# Patient Record
Sex: MALE | Employment: OTHER
[De-identification: names, ages, dates, MRNs, and addresses within clinical notes are randomized per-mention and may not be internally consistent; named-entity substitution may affect disease eponyms.]

---

## 2018-12-28 ENCOUNTER — RECORDS - HEALTHEAST (OUTPATIENT)
Dept: ADMINISTRATIVE | Facility: OTHER | Age: 48
End: 2018-12-28

## 2019-01-09 ENCOUNTER — HOSPITAL ENCOUNTER (OUTPATIENT)
Dept: CARDIOLOGY | Facility: CLINIC | Age: 49
Discharge: HOME | End: 2019-01-09
Attending: FAMILY MEDICINE

## 2019-01-09 DIAGNOSIS — R03.0 ELEVATED BLOOD PRESSURE READING: ICD-10-CM

## 2019-01-09 DIAGNOSIS — R79.82 ELEVATED C-REACTIVE PROTEIN (CRP): ICD-10-CM

## 2019-01-09 DIAGNOSIS — R00.2 PALPITATION: ICD-10-CM

## 2019-01-09 LAB
AORTIC ROOT: 3.6 CM
AORTIC VALVE MEAN VELOCITY: 71.2 CM/S
ASCENDING AORTA: 3.2 CM
AV DIMENSIONLESS INDEX VTI: 0.7
AV MEAN GRADIENT: 2 MMHG
AV PEAK GRADIENT: 3.9 MMHG
AV VALVE AREA: 3.1 CM2
AV VELOCITY RATIO: 0.8
BSA FOR ECHO PROCEDURE: 2.01 M2
CV BLOOD PRESSURE: ABNORMAL MMHG
CV ECHO HEIGHT: 65 IN
CV ECHO WEIGHT: 194 LBS
DOP CALC AO PEAK VEL: 99.3 CM/S
DOP CALC AO VTI: 18.3 CM
DOP CALC LVOT AREA: 4.15 CM2
DOP CALC LVOT DIAMETER: 2.3 CM
DOP CALC LVOT PEAK VEL: 76.8 CM/S
DOP CALC LVOT STROKE VOLUME: 56.9 CM3
DOP CALCLVOT PEAK VEL VTI: 13.7 CM
EJECTION FRACTION: 58 % (ref 55–75)
FRACTIONAL SHORTENING: 31 % (ref 28–44)
INTERVENTRICULAR SEPTUM IN END DIASTOLE: 1.2 CM (ref 0.6–1)
IVS/PW RATIO: 1.1
LA AREA 1: 14.6 CM2
LA AREA 2: 18.7 CM2
LEFT ATRIUM LENGTH: 4.92 CM
LEFT ATRIUM SIZE: 4 CM
LEFT ATRIUM VOLUME INDEX: 23.5 ML/M2
LEFT ATRIUM VOLUME: 47.2 ML
LEFT VENTRICLE CARDIAC INDEX: 2.1 L/MIN/M2
LEFT VENTRICLE CARDIAC OUTPUT: 4.2 L/MIN
LEFT VENTRICLE DIASTOLIC VOLUME INDEX: 29.4 CM3/M2 (ref 34–74)
LEFT VENTRICLE DIASTOLIC VOLUME: 59 CM3 (ref 62–150)
LEFT VENTRICLE HEART RATE: 74 BPM
LEFT VENTRICLE MASS INDEX: 83.3 G/M2
LEFT VENTRICLE SYSTOLIC VOLUME INDEX: 12.4 CM3/M2 (ref 11–31)
LEFT VENTRICLE SYSTOLIC VOLUME: 25 CM3 (ref 21–61)
LEFT VENTRICULAR INTERNAL DIMENSION IN DIASTOLE: 4.2 CM (ref 4.2–5.8)
LEFT VENTRICULAR INTERNAL DIMENSION IN SYSTOLE: 2.9 CM (ref 2.5–4)
LEFT VENTRICULAR MASS: 167.4 G
LEFT VENTRICULAR OUTFLOW TRACT MEAN GRADIENT: 1 MMHG
LEFT VENTRICULAR OUTFLOW TRACT MEAN VELOCITY: 52.5 CM/S
LEFT VENTRICULAR OUTFLOW TRACT PEAK GRADIENT: 2 MMHG
LEFT VENTRICULAR POSTERIOR WALL IN END DIASTOLE: 1.1 CM (ref 0.6–1)
LV STROKE VOLUME INDEX: 28.3 ML/M2
MITRAL VALVE E/A RATIO: 0.8
MV AVERAGE E/E' RATIO: 5.3 CM/S
MV DECELERATION TIME: 254 MS
MV E'TISSUE VEL-LAT: 9.07 CM/S
MV E'TISSUE VEL-MED: 5.26 CM/S
MV LATERAL E/E' RATIO: 4.2
MV MEDIAL E/E' RATIO: 7.3
MV PEAK A VELOCITY: 47.2 CM/S
MV PEAK E VELOCITY: 38.3 CM/S
NUC REST DIASTOLIC VOLUME INDEX: 3104 LBS
NUC REST SYSTOLIC VOLUME INDEX: 65 IN
TRICUSPID VALVE ANULAR PLANE SYSTOLIC EXCURSION: 1.3 CM

## 2019-01-09 ASSESSMENT — MIFFLIN-ST. JEOR: SCORE: 1676.86

## 2019-01-10 ENCOUNTER — RECORDS - HEALTHEAST (OUTPATIENT)
Dept: ADMINISTRATIVE | Facility: OTHER | Age: 49
End: 2019-01-10

## 2019-01-10 ENCOUNTER — HOSPITAL ENCOUNTER (OUTPATIENT)
Dept: CARDIOLOGY | Facility: HOSPITAL | Age: 49
Discharge: HOME | End: 2019-01-10
Attending: FAMILY MEDICINE

## 2019-01-10 DIAGNOSIS — R07.89 CHEST TIGHTNESS: ICD-10-CM

## 2019-01-10 LAB
CV STRESS CURRENT BP HE: NORMAL
CV STRESS CURRENT HR HE: 107
CV STRESS CURRENT HR HE: 110
CV STRESS CURRENT HR HE: 111
CV STRESS CURRENT HR HE: 111
CV STRESS CURRENT HR HE: 112
CV STRESS CURRENT HR HE: 113
CV STRESS CURRENT HR HE: 114
CV STRESS CURRENT HR HE: 115
CV STRESS CURRENT HR HE: 115
CV STRESS CURRENT HR HE: 116
CV STRESS CURRENT HR HE: 117
CV STRESS CURRENT HR HE: 119
CV STRESS CURRENT HR HE: 123
CV STRESS CURRENT HR HE: 124
CV STRESS CURRENT HR HE: 125
CV STRESS CURRENT HR HE: 126
CV STRESS CURRENT HR HE: 131
CV STRESS CURRENT HR HE: 132
CV STRESS CURRENT HR HE: 135
CV STRESS CURRENT HR HE: 136
CV STRESS CURRENT HR HE: 136
CV STRESS CURRENT HR HE: 141
CV STRESS CURRENT HR HE: 148
CV STRESS CURRENT HR HE: 149
CV STRESS CURRENT HR HE: 153
CV STRESS CURRENT HR HE: 153
CV STRESS CURRENT HR HE: 154
CV STRESS CURRENT HR HE: 154
CV STRESS CURRENT HR HE: 93
CV STRESS DEVIATION TIME HE: NORMAL
CV STRESS ECHO PERCENT HR HE: NORMAL
CV STRESS EXERCISE STAGE HE: NORMAL
CV STRESS FINAL RESTING BP HE: NORMAL
CV STRESS FINAL RESTING HR HE: 110
CV STRESS MAX HR HE: 154
CV STRESS MAX TREADMILL GRADE HE: 16
CV STRESS MAX TREADMILL SPEED HE: 4.2
CV STRESS PEAK DIA BP HE: NORMAL
CV STRESS PEAK SYS BP HE: NORMAL
CV STRESS PHASE HE: NORMAL
CV STRESS PROTOCOL HE: NORMAL
CV STRESS RESTING PT POSITION HE: NORMAL
CV STRESS RESTING PT POSITION HE: NORMAL
CV STRESS ST DEVIATION AMOUNT HE: NORMAL
CV STRESS ST DEVIATION ELEVATION HE: NORMAL
CV STRESS ST EVELATION AMOUNT HE: NORMAL
CV STRESS TEST TYPE HE: NORMAL
CV STRESS TOTAL STAGE TIME MIN 1 HE: NORMAL
ECHO EJECTION FRACTION ESTIMATED: 60 %
STRESS ECHO BASELINE BP: NORMAL
STRESS ECHO BASELINE HR: 96
STRESS ECHO CALCULATED PERCENT HR: 89 %
STRESS ECHO LAST STRESS BP: NORMAL
STRESS ECHO LAST STRESS HR: 154
STRESS ECHO POST ESTIMATED WORKLOAD: 12.1
STRESS ECHO POST EXERCISE DUR MIN: 11
STRESS ECHO POST EXERCISE DUR SEC: 59
STRESS ECHO TARGET HR: 148

## 2020-01-28 ENCOUNTER — RECORDS - HEALTHEAST (OUTPATIENT)
Dept: ADMINISTRATIVE | Facility: OTHER | Age: 50
End: 2020-01-28

## 2020-02-28 ENCOUNTER — OFFICE VISIT (OUTPATIENT)
Dept: ENDOCRINOLOGY | Facility: CLINIC | Age: 50
End: 2020-02-28
Payer: COMMERCIAL

## 2020-02-28 VITALS
OXYGEN SATURATION: 95 % | SYSTOLIC BLOOD PRESSURE: 124 MMHG | HEART RATE: 78 BPM | WEIGHT: 194.6 LBS | DIASTOLIC BLOOD PRESSURE: 80 MMHG

## 2020-02-28 DIAGNOSIS — R79.89 LOW TESTOSTERONE IN MALE: Primary | ICD-10-CM

## 2020-02-28 PROCEDURE — 99204 OFFICE O/P NEW MOD 45 MIN: CPT | Performed by: INTERNAL MEDICINE

## 2020-02-28 RX ORDER — MOMETASONE FUROATE AND FORMOTEROL FUMARATE DIHYDRATE 100; 5 UG/1; UG/1
AEROSOL RESPIRATORY (INHALATION)
COMMUNITY
Start: 2020-02-17

## 2020-02-28 RX ORDER — PROPRANOLOL HCL 60 MG
CAPSULE, EXTENDED RELEASE 24HR ORAL
COMMUNITY
Start: 2020-01-24

## 2020-02-28 RX ORDER — MULTIVIT-MIN/IRON FUM/FOLIC AC 7.5 MG-4
1 TABLET ORAL
COMMUNITY

## 2020-02-28 RX ORDER — PRASTERONE (DHEA) 50 MG
CAPSULE ORAL
COMMUNITY

## 2020-02-28 NOTE — PROGRESS NOTES
"CC: Low testosterone.     HPI: Patient here for evaluation of low testosterone.   States 5 years ago, in [place], he was tested as part of \"normal labs\" and told he had \"borderline levels\".   Began DHEA 3 years ago. Takes 50 mcg daily.     Of note, began keto diet 1 month ago.   He has lost 12 pounds so far.   His energy has improved as well.     Notes some trouble getting an erection. Feels this is 2/2 propanolol use.   This has improved since starting keto diet.   Libido has declined over the years.     He has never fathered a child.   Puberty on par with peers.     Only one testicle. 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 It was never removed.     No personal history of blood clots.     No snoring.     He has been taking sea kelp for the last 9 months for \"thyroid inflammation\". States the inflammation has \"cleared up\".     Labs from 1/24/2020:  DHEA-S 407 ()  Testosterone total 223 (250-1100) drawn at 0900.     ROS: 10 point ROS neg other than the symptoms noted above in the HPI.    PMH:   Asthma   HTN    Meds:  Current Outpatient Medications   Medication     Cholecalciferol (VITAMIN D3) 25 MCG (1000 UT) CAPS     DHEA 25 MG CAPS     DULERA 100-5 MCG/ACT inhaler     melatonin 3 MG CAPS     multivitamins w/minerals tablet     propranolol ER (INDERAL LA) 60 MG 24 hr capsule     Ubiquinol 100 MG CAPS     No current facility-administered medications for this visit.      FHX:   Mother has hypothyroidism  Sister has MS.   \"Clotting issues\" on his mother's side of the family.     SHX:  Non-smoker.   Works in [place] and travels back and forth to [place].     Exam:   Vital signs:      BP: 124/80 Pulse: 78     SpO2: 95 %       Weight: 88.3 kg (194 lb 9.6 oz)  There is no height or weight on file to calculate BMI.  Gen: In NAD.   HEENT: no proptosis or lid lag, EOMI, no palpable thyroid tissue.  Card: S1 S2 RRR no m/r/g. no LE edema.   Pulm: CTA b/l.   GI: NT ND +BS.   : single 20 ml testicle.   MSK: no gross deformities. "   Derm: no rashes or lesions. Normal male body hair pattern.   Neuro: no tremor, +2 DTR's.     A/P:   Low testosterone - Discussed basics of testosterone replacement and what is a reasonable expectation: improvement in  libido. Discussed AE's with testosterone. Discussed data on possible increased risk of CV events in  patients using testosterone. Discussed how conclusive studies have not been done yet but it does raise  concern and caution. No TSH or prolactin. Prior TSH elevation so this needs follow up.   -Check TSH, prolactin, and testosterone.   -At this point, he does not need testosterone replacement based on sexual symptoms.     Jovanny Baker MD on 2/28/2020 at 11:35 AM      CC:  Mimi Pillai MD    [address] [place] [address]    Phone: [phone]    Fax: [phone]

## 2020-02-28 NOTE — LETTER
"    2/28/2020         RE: Andres Cochran  [address]  [place] [address]        Dear Colleague,    Thank you for referring your patient, Andres Cochran, to the [facility]. Please see a copy of my visit note below.    CC: Low testosterone.     HPI: Patient here for evaluation of low testosterone.   States 5 years ago, in [place], he was tested as part of \"normal labs\" and told he had \"borderline levels\".   Began DHEA 3 years ago. Takes 50 mcg daily.     Of note, began keto diet 1 month ago.   He has lost 12 pounds so far.   His energy has improved as well.     Notes some trouble getting an erection. Feels this is 2/2 propanolol use.   This has improved since starting keto diet.   Libido has declined over the years.     He has never fathered a child.   Puberty on par with peers.     Only one testicle. 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 It was never removed.     No personal history of blood clots.     No snoring.     He has been taking sea kelp for the last 9 months for \"thyroid inflammation\". States the inflammation has \"cleared up\".     Labs from 1/24/2020:  DHEA-S 407 ()  Testosterone total 223 (250-1100) drawn at 0900.     ROS: 10 point ROS neg other than the symptoms noted above in the HPI.    PMH:   Asthma   HTN    Meds:  Current Outpatient Medications   Medication     Cholecalciferol (VITAMIN D3) 25 MCG (1000 UT) CAPS     DHEA 25 MG CAPS     DULERA 100-5 MCG/ACT inhaler     melatonin 3 MG CAPS     multivitamins w/minerals tablet     propranolol ER (INDERAL LA) 60 MG 24 hr capsule     Ubiquinol 100 MG CAPS     No current facility-administered medications for this visit.      FHX:   Mother has hypothyroidism  Sister has MS.   \"Clotting issues\" on his mother's side of the family.     SHX:  Non-smoker.   Works in [place] and travels back and forth to [place].     Exam:   Vital signs:      BP: 124/80 Pulse: 78     SpO2: 95 %       Weight: 88.3 kg (194 lb 9.6 oz)  There is no height or weight on " file to calculate BMI.  Gen: In NAD.   HEENT: no proptosis or lid lag, EOMI, no palpable thyroid tissue.  Card: S1 S2 RRR no m/r/g. no LE edema.   Pulm: CTA b/l.   GI: NT ND +BS.   : single 20 ml testicle.   MSK: no gross deformities.   Derm: no rashes or lesions. Normal male body hair pattern.   Neuro: no tremor, +2 DTR's.     A/P:   Low testosterone - Discussed basics of testosterone replacement and what is a reasonable expectation: improvement in  libido. Discussed AE's with testosterone. Discussed data on possible increased risk of CV events in  patients using testosterone. Discussed how conclusive studies have not been done yet but it does raise  concern and caution. No TSH or prolactin. Prior TSH elevation so this needs follow up.   -Check TSH, prolactin, and testosterone.   -At this point, he does not need testosterone replacement based on sexual symptoms.     Jovanny Baker MD on 2/28/2020 at 11:35 AM      CC:  Mimi Pillai MD    [address] [place] [address]    Phone: [phone]    Fax: [phone]       Again, thank you for allowing me to participate in the care of your patient.        Sincerely,        Jovanny Baker MD

## 2020-02-28 NOTE — Clinical Note
Please CC:Mimi Pillai MD  [address] [place] [address]  Phone: [phone]  Fax: [phone]

## 2020-03-02 ENCOUNTER — TELEPHONE (OUTPATIENT)
Dept: ENDOCRINOLOGY | Facility: CLINIC | Age: 50
End: 2020-03-02

## 2020-03-02 DIAGNOSIS — R79.89 LOW TESTOSTERONE IN MALE: ICD-10-CM

## 2020-03-02 LAB
FSH SERPL-ACNC: 5.2 IU/L (ref 0.7–10.8)
LH SERPL-ACNC: 1.4 IU/L (ref 1.5–9.3)
PROLACTIN SERPL-MCNC: 6 UG/L (ref 2–18)
T4 FREE SERPL-MCNC: 1.07 NG/DL (ref 0.76–1.46)
TSH SERPL DL<=0.005 MIU/L-ACNC: 5.17 MU/L (ref 0.4–4)

## 2020-03-02 PROCEDURE — 84439 ASSAY OF FREE THYROXINE: CPT | Performed by: INTERNAL MEDICINE

## 2020-03-02 PROCEDURE — 86376 MICROSOMAL ANTIBODY EACH: CPT | Performed by: INTERNAL MEDICINE

## 2020-03-02 PROCEDURE — 84443 ASSAY THYROID STIM HORMONE: CPT | Performed by: INTERNAL MEDICINE

## 2020-03-02 PROCEDURE — 84270 ASSAY OF SEX HORMONE GLOBUL: CPT | Performed by: INTERNAL MEDICINE

## 2020-03-02 PROCEDURE — 84146 ASSAY OF PROLACTIN: CPT | Performed by: INTERNAL MEDICINE

## 2020-03-02 PROCEDURE — 36415 COLL VENOUS BLD VENIPUNCTURE: CPT | Performed by: INTERNAL MEDICINE

## 2020-03-02 PROCEDURE — 83001 ASSAY OF GONADOTROPIN (FSH): CPT | Performed by: INTERNAL MEDICINE

## 2020-03-02 PROCEDURE — 84403 ASSAY OF TOTAL TESTOSTERONE: CPT | Performed by: INTERNAL MEDICINE

## 2020-03-02 PROCEDURE — 83002 ASSAY OF GONADOTROPIN (LH): CPT | Performed by: INTERNAL MEDICINE

## 2020-03-04 ENCOUNTER — TELEPHONE (OUTPATIENT)
Dept: ENDOCRINOLOGY | Facility: CLINIC | Age: 50
End: 2020-03-04

## 2020-03-04 DIAGNOSIS — R79.89 LOW TESTOSTERONE IN MALE: Primary | ICD-10-CM

## 2020-03-04 DIAGNOSIS — E03.8 OTHER SPECIFIED HYPOTHYROIDISM: ICD-10-CM

## 2020-03-04 LAB
SHBG SERPL-SCNC: 13 NMOL/L (ref 11–80)
TESTOST FREE SERPL-MCNC: 6.39 NG/DL (ref 4.7–24.4)
TESTOST SERPL-MCNC: 218 NG/DL (ref 240–950)

## 2020-03-04 RX ORDER — LEVOTHYROXINE SODIUM 50 UG/1
50 TABLET ORAL DAILY
Qty: 30 TABLET | Refills: 11 | Status: SHIPPED | OUTPATIENT
Start: 2020-03-04

## 2020-03-04 NOTE — TELEPHONE ENCOUNTER
Pt would like to discuss pros and cons of levothyroxine    Eduarda Page RN Specialty Triage 3/4/2020 2:55 PM

## 2020-03-04 NOTE — TELEPHONE ENCOUNTER
----- Message from Jovanny Baker MD sent at 3/4/2020 10:18 AM CST -----  Total testosterone low but free (active) level is normal.   TSH remains elevated and is likely contributing to the low testosterone.   I would recommend starting 50 mcg levothyroxine daily and checking TSH with reflex and total and free testosterone in 3 months.   See me in clinic after.     Jovanny Baker MD on 3/4/2020 at 10:16 AM

## 2020-03-05 NOTE — TELEPHONE ENCOUNTER
Called and spoke to patient, informed that I was calling to schedule a phone appt between him and Dr Baker. Appt scheduled for tomorrow at 0800 central time. Informed patient that Dr. Baker will call him on the telephone we have on file. Patient agreed and no further questions noted.     Pat CRESPO MA

## 2020-03-06 ENCOUNTER — VIRTUAL VISIT (OUTPATIENT)
Dept: ENDOCRINOLOGY | Facility: CLINIC | Age: 50
End: 2020-03-06
Payer: COMMERCIAL

## 2020-03-06 DIAGNOSIS — E03.8 OTHER SPECIFIED HYPOTHYROIDISM: Primary | ICD-10-CM

## 2020-03-06 LAB — THYROPEROXIDASE AB SERPL-ACNC: <10 IU/ML

## 2020-03-06 PROCEDURE — 99442 ZZC PHYSICIAN TELEPHONE EVALUATION 11-20 MIN: CPT | Performed by: INTERNAL MEDICINE

## 2020-03-06 RX ORDER — POTASSIUM IODIDE 65 MG/ML
SOLUTION ORAL
COMMUNITY

## 2020-03-06 NOTE — PROGRESS NOTES
"S: Patient here for evaluation of low testosterone. (phone visit)  States 5 years ago, in [place], he was tested as part of \"normal labs\" and told he had \"borderline levels\".   Began DHEA 3 years ago. Takes 50 mcg daily.     Of note, began keto diet 1 month ago.   He has lost 12 pounds so far.   His energy has improved as well.     Notes some trouble getting an erection. Feels this is 2/2 propanolol use.   This has improved since starting keto diet.   Libido has declined over the years.     He has never fathered a child.   Puberty on par with peers.     Only one testicle. 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 It was never removed.     No personal history of blood clots.     No snoring.     He has been taking sea kelp for the last 9 months for \"thyroid inflammation\". States the inflammation has \"cleared up\".     Labs from 1/24/2020:  DHEA-S 407 ()  Testosterone total 223 (250-1100) drawn at 0900.     Phone visit on 3/6/2020 to review his labs.     ROS: 10 point ROS neg other than the symptoms noted above in the HPI.    Exam:   NA    A/P:   Low testosterone - Discussed basics of testosterone replacement and what is a reasonable expectation: improvement in  libido. Discussed AE's with testosterone. Discussed data on possible increased risk of CV events in  patients using testosterone. Discussed how conclusive studies have not been done yet but it does raise  concern and caution. No TSH or prolactin. Prior TSH elevation so this needs follow up.   -Discussion of TSH as below.   -At this point, he does not need testosterone replacement based on sexual symptoms.     Subclinical hypothyroidism - Extensive discussion of thyroid hormone and normal physiology. Included was discussion of thyroid in  relation to weight and energy.  He is concerned about worsening anxiety with levothyroxine. He gets occasional palpitations as well.   He would like to know if levothyroxine would be a life long therapy. Discussed how there is " no proven dietary or supplement to treat hypothyroidism. He started taking an iodine supplement after last lab draw. I advised against this.   -Check TPO (add on).       I spent 15 minutes with the patient. Greater than 50% of the time spent in . Risks/benefits/alternatives reviewed.     Jovanny Baker MD on 3/6/2020 at 8:55 AM

## 2021-06-02 VITALS — HEIGHT: 65 IN | WEIGHT: 194 LBS | BODY MASS INDEX: 32.32 KG/M2
